# Patient Record
Sex: FEMALE | URBAN - METROPOLITAN AREA
[De-identification: names, ages, dates, MRNs, and addresses within clinical notes are randomized per-mention and may not be internally consistent; named-entity substitution may affect disease eponyms.]

---

## 2020-11-16 ENCOUNTER — OFFICE VISIT (OUTPATIENT)
Dept: URBAN - METROPOLITAN AREA CLINIC 11 | Facility: CLINIC | Age: 75
End: 2020-11-16
Payer: COMMERCIAL

## 2020-11-16 DIAGNOSIS — H25.813 COMBINED FORMS OF AGE-RELATED CATARACT, BILATERAL: Primary | ICD-10-CM

## 2020-11-16 DIAGNOSIS — R73.03 PREDIABETES: ICD-10-CM

## 2020-11-16 PROCEDURE — 92004 COMPRE OPH EXAM NEW PT 1/>: CPT | Performed by: OPTOMETRIST

## 2020-11-16 ASSESSMENT — KERATOMETRY
OD: 43.88
OS: 43.75

## 2020-11-16 ASSESSMENT — INTRAOCULAR PRESSURE
OD: 16
OS: 16

## 2020-11-16 ASSESSMENT — VISUAL ACUITY
OS: 20/80
OD: 20/150

## 2020-11-16 NOTE — IMPRESSION/PLAN
Impression: Prediabetes: R73.03. Plan: No signs of retinopathy or neovascularization noted. Discussed ocular and systemic benefits of blood sugar control. Continue blood sugar control.  RTC 1yr complete exam

## 2020-11-16 NOTE — IMPRESSION/PLAN
Impression: Combined forms of age-related cataract, bilateral: H25.813. Plan: Recommend cataract surgery OD then OS.  Refer to MD for Cat sx terrence.